# Patient Record
Sex: FEMALE | Race: WHITE | NOT HISPANIC OR LATINO | Employment: FULL TIME | URBAN - METROPOLITAN AREA
[De-identification: names, ages, dates, MRNs, and addresses within clinical notes are randomized per-mention and may not be internally consistent; named-entity substitution may affect disease eponyms.]

---

## 2021-07-06 ENCOUNTER — OFFICE VISIT (OUTPATIENT)
Dept: URGENT CARE | Facility: CLINIC | Age: 36
End: 2021-07-06
Payer: COMMERCIAL

## 2021-07-06 VITALS
SYSTOLIC BLOOD PRESSURE: 100 MMHG | TEMPERATURE: 99.7 F | OXYGEN SATURATION: 96 % | HEIGHT: 66 IN | RESPIRATION RATE: 12 BRPM | WEIGHT: 150 LBS | HEART RATE: 106 BPM | BODY MASS INDEX: 24.11 KG/M2 | DIASTOLIC BLOOD PRESSURE: 64 MMHG

## 2021-07-06 DIAGNOSIS — J02.0 ACUTE STREPTOCOCCAL PHARYNGITIS: ICD-10-CM

## 2021-07-06 LAB
INT CON NEG: NORMAL
INT CON POS: NORMAL
S PYO AG THROAT QL: NORMAL

## 2021-07-06 PROCEDURE — 87880 STREP A ASSAY W/OPTIC: CPT | Performed by: NURSE PRACTITIONER

## 2021-07-06 PROCEDURE — 99203 OFFICE O/P NEW LOW 30 MIN: CPT | Performed by: NURSE PRACTITIONER

## 2021-07-06 RX ORDER — AMOXICILLIN 500 MG/1
500 CAPSULE ORAL 2 TIMES DAILY
Qty: 20 CAPSULE | Refills: 0 | Status: SHIPPED | OUTPATIENT
Start: 2021-07-06 | End: 2021-07-16

## 2021-07-06 NOTE — PROGRESS NOTES
Chief Complaint   Patient presents with   • Sore Throat         HISTORY OF PRESENT ILLNESS: Patient is a pleasant 36 y.o. female who presents today due to complaints of a sore throat for the past 8 days. Reports associated fever, chills, pain with swallowing.  T-max 102.  Pain is moderate. Denies associated rash, chest pain, urinary complaints, congestion, cough, or difficulty breathing. She has tried OTC medications at home without much improvement. Her daughter is ill with similar symptoms, has been seen in clinic today, positive for strep.  She has not been vaccinated for COVID-19.    Patient Active Problem List    Diagnosis Date Noted   • Other plastic surgery for unacceptable cosmetic appearance 10/01/2014       Allergies:Patient has no known allergies.    Current Outpatient Medications Ordered in Epic   Medication Sig Dispense Refill   • amoxicillin (AMOXIL) 500 MG Cap Take 1 capsule by mouth 2 times a day for 10 days. 20 capsule 0   • therapeutic multivitamin-minerals (THERAGRAN-M) TABS Take 1 Tab by mouth every day.     • glucosamine Sulfate 500 MG CAPS Take 500 mg by mouth every day.     • Homeopathic Products (ARNICA [place] PO) Take 1 Tab by mouth every day.       No current [facility]-ordered facility-administered medications on file.       Past Medical History:   Diagnosis Date   • Anesthesia     PONV       Social History     Tobacco Use   • Smoking status: Never Smoker   Substance Use Topics   • Alcohol use: Yes     Comment: 2/week   • Drug use: No       No family status information on file.   History reviewed. No pertinent family history.    ROS:  Review of Systems   Constitutional: Positive for fever, chills. Negative for weight loss, malaise, and fatigue.   HENT: Positive for sore throat. Negative for ear pain, nosebleeds, congestion.   Eyes: Negative for vision changes.   Cardiovascular: Negative for chest pain, palpitations, orthopnea and leg swelling.   Respiratory: Negative for cough, sputum  "production, shortness of breath and wheezing.   Gastrointestinal: Negative for abdominal pain, nausea, vomiting or diarrhea.   : Negative for changes in urination.   Skin: Negative for rash, diaphoresis.     Exam:  /64   Pulse (!) 106   Temp 37.6 °C (99.7 °F) (Temporal)   Resp 12   Ht 1.676 m (5' 6\")   Wt 68 kg (150 lb)   SpO2 96%   General: well-nourished, well-developed female in NAD  Head: normocephalic, atraumatic  Eyes: PERRLA, no conjunctival injection, acuity grossly intact, lids normal.  Ears: normal shape and symmetry, no tenderness, no discharge. External canals are without any significant edema or erythema. Tympanic membranes are without any inflammation, no effusion. Gross auditory acuity is intact.  Nose: symmetrical without tenderness, no discharge.  Mouth/Throat: reasonable hygiene. There is erythema, with exudates and tonsillar enlargement present.  Neck: no masses, range of motion within normal limits, no tracheal deviation. No obvious thyroid enlargement.   Lymph: bilateral anterior cervical adenopathy. No supraclavicular adenopathy.   Neuro: alert and oriented. Cranial nerves 1-12 grossly intact. No sensory deficit.   Cardiovascular: regular rate and rhythm. No edema.  Pulmonary: no distress. Chest is symmetrical with respiration, no wheezes, crackles, or rhonchi.   Musculoskeletal: no clubbing, appropriate muscle tone, gait is stable.  Skin: warm, dry, intact, no clubbing, no cyanosis, no rashes.   Psych: appropriate mood, affect, judgement.       POC strep positive      Assessment/Plan:  1. Acute streptococcal pharyngitis  POCT Rapid Strep A    amoxicillin (AMOXIL) 500 MG Cap           Patient is well-appearing, nontoxic, will treat as outpatient, antibiotic as directed. OTC motrin or tylenol for pain/fever control. Hand hygiene. Increase fluid intake, rest. Warm salt water gargles.   Supportive care, differential diagnoses, and indications for immediate follow-up discussed with " patient.   Pathogenesis of diagnosis discussed including typical length and natural progression.   Instructed to return to clinic or nearest emergency department for any change in condition, further concerns, or worsening of symptoms.  Patient states understanding of the plan of care and discharge instructions.  Instructed to make an appointment, for follow up, with her primary care provider.        Please note that this dictation was created using voice recognition software. I have made every reasonable attempt to correct obvious errors, but I expect that there are errors of grammar and possibly content that I did not discover before finalizing the note. N95 and safety glasses used for entire visit.       REA Parr.

## 2022-01-03 ENCOUNTER — HOSPITAL ENCOUNTER (OUTPATIENT)
Dept: RADIOLOGY | Facility: MEDICAL CENTER | Age: 37
End: 2022-01-03
Attending: OBSTETRICS & GYNECOLOGY
Payer: COMMERCIAL

## 2022-01-03 DIAGNOSIS — R10.2 ADNEXAL TENDERNESS, RIGHT: ICD-10-CM

## 2022-01-03 PROCEDURE — 76830 TRANSVAGINAL US NON-OB: CPT

## 2022-12-13 ENCOUNTER — OFFICE VISIT (OUTPATIENT)
Dept: URGENT CARE | Facility: CLINIC | Age: 37
End: 2022-12-13
Payer: COMMERCIAL

## 2022-12-13 ENCOUNTER — APPOINTMENT (OUTPATIENT)
Dept: URGENT CARE | Facility: PHYSICIAN GROUP | Age: 37
End: 2022-12-13

## 2022-12-13 VITALS
BODY MASS INDEX: 22.5 KG/M2 | RESPIRATION RATE: 14 BRPM | OXYGEN SATURATION: 97 % | HEIGHT: 66 IN | WEIGHT: 140 LBS | HEART RATE: 99 BPM | SYSTOLIC BLOOD PRESSURE: 98 MMHG | TEMPERATURE: 98.2 F | DIASTOLIC BLOOD PRESSURE: 66 MMHG

## 2022-12-13 DIAGNOSIS — R50.9 FEVER, UNSPECIFIED FEVER CAUSE: ICD-10-CM

## 2022-12-13 DIAGNOSIS — R05.1 ACUTE COUGH: ICD-10-CM

## 2022-12-13 DIAGNOSIS — J20.9 ACUTE BRONCHITIS, UNSPECIFIED ORGANISM: ICD-10-CM

## 2022-12-13 LAB
EXTERNAL QUALITY CONTROL: NORMAL
FLUAV+FLUBV AG SPEC QL IA: NEGATIVE
INT CON NEG: NORMAL
INT CON POS: NORMAL
S PYO AG THROAT QL: NEGATIVE
SARS-COV+SARS-COV-2 AG RESP QL IA.RAPID: NEGATIVE

## 2022-12-13 PROCEDURE — 87804 INFLUENZA ASSAY W/OPTIC: CPT

## 2022-12-13 PROCEDURE — 99213 OFFICE O/P EST LOW 20 MIN: CPT

## 2022-12-13 PROCEDURE — 87426 SARSCOV CORONAVIRUS AG IA: CPT

## 2022-12-13 PROCEDURE — 87880 STREP A ASSAY W/OPTIC: CPT

## 2022-12-13 RX ORDER — BENZONATATE 100 MG/1
100 CAPSULE ORAL 3 TIMES DAILY PRN
Qty: 60 CAPSULE | Refills: 0 | Status: SHIPPED | OUTPATIENT
Start: 2022-12-13

## 2022-12-13 RX ORDER — ALBUTEROL SULFATE 90 UG/1
1 AEROSOL, METERED RESPIRATORY (INHALATION) EVERY 6 HOURS PRN
Qty: 8.5 G | Refills: 0 | Status: SHIPPED | OUTPATIENT
Start: 2022-12-13

## 2022-12-13 RX ORDER — PREDNISONE 10 MG/1
20 TABLET ORAL DAILY
Qty: 10 TABLET | Refills: 0 | Status: SHIPPED | OUTPATIENT
Start: 2022-12-13 | End: 2022-12-18

## 2022-12-13 ASSESSMENT — ENCOUNTER SYMPTOMS
SORE THROAT: 1
WHEEZING: 1
FEVER: 1
COUGH: 1
HEADACHES: 1
VOMITING: 0
SHORTNESS OF BREATH: 1
MYALGIAS: 1
DIARRHEA: 0
SPUTUM PRODUCTION: 1

## 2022-12-14 NOTE — PROGRESS NOTES
Subjective     Yi Hussein is a 37 y.o. female who presents with URI (Sx 6 days fevers, fatigue , sob , body aches chills , )            HPI    Patient presents with symptoms that started 5 days ago.  Patient reports fever and chills, temperature highest at 101.7 °F, and last elevated temperature 3 days ago.  Patient patient currently endorses bilateral ear fullness, nasal congestion, sore throat, cough, and myalgias.  She describes her cough to be productive, worse at night, especially when lying supine.  She further notes accompanying shortness of breath and wheezing.  Patient been taking Tylenol, DayQuil, and NyQuil.  She reports these provide temporary relief.  She denies being COVID vaccinated.  She denies any known sick contacts, but reports that she has attended two parties recently.    Patient's current problem list, medications, and past medical/surgical history were reviewed in Epic.    PMH:  has a past medical history of Anesthesia.  MEDS:   Current Outpatient Medications:     therapeutic multivitamin-minerals (THERAGRAN-M) TABS, Take 1 Tab by mouth every day., Disp: , Rfl:     glucosamine Sulfate 500 MG CAPS, Take 500 mg by mouth every day., Disp: , Rfl:     Homeopathic Products (ARNICA [place] PO), Take 1 Tab by mouth every day., Disp: , Rfl:   ALLERGIES: No Known Allergies  SURGHX:   Past Surgical History:   Procedure Laterality Date    BREAST IMPLANT REMOVAL  10/1/2014    Performed by Abhijit Barbour M.D. at [facility] ORS    MAMMOPLASTY AUGMENTATION  10/1/2014    Performed by Abhijit Barbour M.D. at [facility] ORS    LIPOSUCTION  10/1/2014    Performed by Abhijit Barbour M.D. at [facility] ORS    [place] ENLARGE BREAST  2004     SOCHX:  reports that she has never smoked. She does not have any smokeless tobacco history on file. She reports current alcohol use. She reports that she does not use drugs.  FH: Reviewed with patient, not pertinent to this visit.  "      Review of Systems   Constitutional:  Positive for fever and malaise/fatigue.   HENT:  Positive for congestion, ear pain (ear fullness) and sore throat.    Respiratory:  Positive for cough, sputum production (green), shortness of breath and wheezing.    Gastrointestinal:  Negative for diarrhea and vomiting.   Musculoskeletal:  Positive for myalgias.   Neurological:  Positive for headaches.            Objective     BP (!) (P) 98/66 (BP Location: Left arm, Patient Position: Sitting, BP Cuff Size: Adult)   Pulse (P) 99   Temp (P) 36.8 °C (98.2 °F) (Temporal)   Resp (P) 14   Ht (P) 1.676 m (5' 6\")   Wt (P) 63.5 kg (140 lb)   LMP 12/12/2022   SpO2 (P) 97%   BMI (P) 22.60 kg/m²      Physical Exam  Constitutional:       Appearance: Normal appearance.   HENT:      Head: Normocephalic.      Nose: Congestion present.      Mouth/Throat:      Pharynx: Pharyngeal swelling and posterior oropharyngeal erythema present.   Eyes:      Extraocular Movements: Extraocular movements intact.   Cardiovascular:      Rate and Rhythm: Normal rate and regular rhythm.      Pulses: Normal pulses.      Heart sounds: Normal heart sounds.   Pulmonary:      Effort: Pulmonary effort is normal.      Breath sounds: Normal breath sounds.   Musculoskeletal:         General: Normal range of motion.      Cervical back: Normal range of motion.   Skin:     General: Skin is warm and dry.   Neurological:      General: No focal deficit present.      Mental Status: She is alert.   Psychiatric:         Mood and Affect: Mood normal.         Behavior: Behavior normal.         Judgment: Judgment normal.     Results:    Rapid strep a -negative   Rapid COVID test- negative   Influenza A/B-negative        Assessment & Plan     1. Acute bronchitis, unspecified organism    - predniSONE (DELTASONE) 10 MG Tab; Take 2 Tablets by mouth every day for 5 days.  Dispense: 10 Tablet; Refill: 0  - benzonatate (TESSALON) 100 MG Cap; Take 1 Capsule by mouth 3 times a " day as needed for Cough.  Dispense: 60 Capsule; Refill: 0  - albuterol 108 (90 Base) MCG/ACT Aero Soln inhalation aerosol; Inhale 1 Puff every 6 hours as needed for Shortness of Breath.  Dispense: 8.5 g; Refill: 0    2. Fever, unspecified fever cause    - POCT Rapid Strep A  - POCT Influenza A/B  - POCT SARS-COV Antigen TADEO (Symptomatic only)    3. Acute cough    - benzonatate (TESSALON) 100 MG Cap; Take 1 Capsule by mouth 3 times a day as needed for Cough.  Dispense: 60 Capsule; Refill: 0    Patient tested negative for strep, influenza, and COVID.  Her presentation is consistent with acute bronchitis.  She is prescribed prednisone daily for 5 days.  May take Tessalon Perles twice daily as needed for cough.  Albuterol inhaler every 6 hours as needed for shortness of breath, chest congestion, chest tightness, wheezing, or bouts of coughing.  Advised on symptomatic treatment at home.  Discussed treatment plan with patient, she is agreeable and verbalized understanding.  Educated patient on signs and symptoms watch out for, when to return to the clinic or go to the ER.    Recommended supportive treatment at home:  OTC Tylenol or Motrin for fever/discomfort.  OTC supportive care for nasal congestion - saline nasal spray/Flonase nasal spray and/or netipot  Humidifier and steam inhalation/warm showers.  Increase oral fluid intake.    Electronically Signed by CHRISTEN Mary

## 2022-12-23 ENCOUNTER — OFFICE VISIT (OUTPATIENT)
Dept: URGENT CARE | Facility: PHYSICIAN GROUP | Age: 37
End: 2022-12-23
Payer: COMMERCIAL

## 2022-12-23 VITALS
DIASTOLIC BLOOD PRESSURE: 68 MMHG | RESPIRATION RATE: 16 BRPM | SYSTOLIC BLOOD PRESSURE: 122 MMHG | HEART RATE: 68 BPM | OXYGEN SATURATION: 98 % | TEMPERATURE: 98.2 F | HEIGHT: 66 IN | WEIGHT: 140 LBS | BODY MASS INDEX: 22.5 KG/M2

## 2022-12-23 DIAGNOSIS — B02.9 HERPES ZOSTER WITHOUT COMPLICATION: ICD-10-CM

## 2022-12-23 PROCEDURE — 99213 OFFICE O/P EST LOW 20 MIN: CPT | Performed by: STUDENT IN AN ORGANIZED HEALTH CARE EDUCATION/TRAINING PROGRAM

## 2022-12-23 RX ORDER — VALACYCLOVIR HYDROCHLORIDE 1 G/1
1000 TABLET, FILM COATED ORAL 3 TIMES DAILY
Qty: 21 TABLET | Refills: 0 | Status: SHIPPED | OUTPATIENT
Start: 2022-12-23 | End: 2022-12-30

## 2022-12-24 NOTE — PROGRESS NOTES
Subjective:   Yi Hussein is a 37 y.o. female who presents for Other (X 2 days, pain on L side of face and jaw, headache, shingles spot on top of lip )      HPI:  Pleasant 37-year-old female presents urgent care for 2 days of a painful blistered rash just above her upper lip on the left side.  She reports having a similar rash multiple years ago.  She denies ever being treated with antivirals in the past.  She states that the rash is painful.  It did initially started as multiple fluid-filled blisters.  She does report some intermittent headache and some pain over the left side of her jaw.  Denies fever, chills, blurred vision, double vision, photophobia, eye pain, eye redness, facial numbness, facial paralysis, hearing loss, tinnitus, or ear pain.      Medications:    albuterol Aers  ARNICA [place] PO  benzonatate Caps  glucosamine Sulfate Caps  mupirocin Oint  therapeutic multivitamin-minerals Tabs  valacyclovir Tabs    Allergies: Patient has no known allergies.    Problem List: Yi Hussein does not have any pertinent problems on file.    Surgical History:  Past Surgical History:   Procedure Laterality Date    BREAST IMPLANT REMOVAL  10/1/2014    Performed by Abhijit Barbour M.D. at [facility] ORS    MAMMOPLASTY AUGMENTATION  10/1/2014    Performed by Abhijit Barbour M.D. at [facility] ORS    LIPOSUCTION  10/1/2014    Performed by Abhijit Barbour M.D. at [facility] ORS    [place] ENLARGE BREAST  2004       Past Social Hx: Yi Hussein  reports that she has never smoked. She does not have any smokeless tobacco history on file. She reports current alcohol use. She reports that she does not use drugs.     Past Family Hx:  Yi Hussein family history is not on file.     Problem list, medications, and allergies reviewed by myself today in Epic.     Objective:     /68   Pulse 68   Temp 36.8 °C (98.2 °F) (Temporal)   Resp 16   Ht 1.676 m  "(5' 6\")   Wt 63.5 kg (140 lb)   LMP 12/12/2022   SpO2 98%   BMI 22.60 kg/m²     Physical Exam  Vitals reviewed.   Constitutional:       General: She is not in acute distress.     Appearance: Normal appearance.   HENT:      Head: Normocephalic.      Right Ear: Tympanic membrane, ear canal and external ear normal.      Left Ear: Tympanic membrane, ear canal and external ear normal.      Ears:      Comments: No vesicles present on the left ear canal.     Nose: Nose normal.      Mouth/Throat:      Mouth: Mucous membranes are moist.        Comments: 3 grouped vesicles on erythematous base.  There are signs of ruptured vesicles also present.  TTP present.  Patient describes burning sensation to the left jaw.  No rash over this area of pain.  Eyes:      Conjunctiva/sclera: Conjunctivae normal.      Right eye: Right conjunctiva is not injected.      Left eye: Left conjunctiva is not injected.      Pupils: Pupils are equal, round, and reactive to light.   Cardiovascular:      Rate and Rhythm: Normal rate and regular rhythm.      Pulses: Normal pulses.      Heart sounds: Normal heart sounds. No murmur heard.  Pulmonary:      Effort: Pulmonary effort is normal. No respiratory distress.      Breath sounds: Normal breath sounds. No stridor. No wheezing, rhonchi or rales.   Musculoskeletal:      Cervical back: Normal range of motion.   Lymphadenopathy:      Cervical: No cervical adenopathy.   Skin:     General: Skin is warm and dry.      Capillary Refill: Capillary refill takes less than 2 seconds.      Findings: No erythema, lesion or rash.   Neurological:      General: No focal deficit present.      Mental Status: She is alert and oriented to person, place, and time.       Assessment/Plan:     Diagnosis and associated orders:     1. Herpes zoster without complication  valacyclovir (VALTREX) 1 GM Tab    mupirocin (BACTROBAN) 2 % Ointment         Comments/MDM:     Patient's presentation physical exam findings are consistent " with shingles.  We will treat with valacyclovir due to active lesions.  Symptoms started within the past 2 days.  Patient was educated on use this medication and the possible side effects including retraction.  She was also given mupirocin to treat for any possible secondary bacterial infection.  She is agreeable to the plan.  No signs of complication due to this rash.  Vitals all within normal limits.  ED/return precautions were given.  No change to her hearing or vision.         Differential diagnosis, natural history, supportive care, and indications for immediate follow-up discussed.    Advised the patient to follow-up with the primary care physician for recheck, reevaluation, and consideration of further management.    Please note that this dictation was created using voice recognition software. I have made a reasonable attempt to correct obvious errors, but I expect that there are errors of grammar and possibly content that I did not discover before finalizing the note.    Electronically signed by Rocky Rogers PA-C.

## 2022-12-25 ENCOUNTER — OFFICE VISIT (OUTPATIENT)
Dept: URGENT CARE | Facility: CLINIC | Age: 37
End: 2022-12-25
Payer: COMMERCIAL

## 2022-12-25 VITALS
TEMPERATURE: 99.4 F | SYSTOLIC BLOOD PRESSURE: 100 MMHG | WEIGHT: 140 LBS | HEART RATE: 90 BPM | BODY MASS INDEX: 22.5 KG/M2 | OXYGEN SATURATION: 97 % | HEIGHT: 66 IN | RESPIRATION RATE: 16 BRPM | DIASTOLIC BLOOD PRESSURE: 54 MMHG

## 2022-12-25 DIAGNOSIS — J02.0 ACUTE STREPTOCOCCAL PHARYNGITIS: ICD-10-CM

## 2022-12-25 LAB
INT CON NEG: NORMAL
INT CON POS: NORMAL
S PYO AG THROAT QL: POSITIVE

## 2022-12-25 PROCEDURE — 99214 OFFICE O/P EST MOD 30 MIN: CPT | Performed by: NURSE PRACTITIONER

## 2022-12-25 PROCEDURE — 87880 STREP A ASSAY W/OPTIC: CPT | Performed by: NURSE PRACTITIONER

## 2022-12-25 RX ORDER — LIDOCAINE HYDROCHLORIDE 20 MG/ML
5 SOLUTION OROPHARYNGEAL 4 TIMES DAILY PRN
Qty: 120 ML | Refills: 0 | Status: SHIPPED | OUTPATIENT
Start: 2022-12-25

## 2022-12-25 RX ORDER — PENICILLIN V POTASSIUM 500 MG/1
500 TABLET ORAL 3 TIMES DAILY
Qty: 30 TABLET | Refills: 0 | Status: SHIPPED | OUTPATIENT
Start: 2022-12-25 | End: 2023-01-04

## 2022-12-25 NOTE — PROGRESS NOTES
Chief Complaint   Patient presents with    Pharyngitis     Sx yesterday , ear pain , hurts to swallow, white spots        HISTORY OF PRESENT ILLNESS: Patient is a pleasant 37 y.o. female who presents today due to complaints of a sore throat for the past two days. Reports associated fever, chills, pain with swallowing. Pain is moderate. Denies associated rash, chest pain, urinary complaints, congestion, cough, or difficulty breathing. She has tried OTC medications at home without much improvement. Denies known ill contacts.  She was recently diagnosed with herpes zoster, is taking antiviral therapy and tolerating.      Patient Active Problem List    Diagnosis Date Noted    Other plastic surgery for unacceptable cosmetic appearance 10/01/2014       Allergies:Patient has no known allergies.    Current Outpatient Medications Ordered in Epic   Medication Sig Dispense Refill    penicillin v potassium (VEETID) 500 MG Tab Take 1 Tablet by mouth 3 times a day for 10 days. 30 Tablet 0    lidocaine (XYLOCAINE) 2 % Solution Take 5 mL by mouth 4 times a day as needed for Throat/Mouth Pain. Mix 5mL in 1/4 cup of water, swish medication and spit. 120 mL 0    valacyclovir (VALTREX) 1 GM Tab Take 1 Tablet by mouth 3 times a day for 7 days. 21 Tablet 0    mupirocin (BACTROBAN) 2 % Ointment Apply 1 Application topically 2 times a day for 7 days. 22 g 0    benzonatate (TESSALON) 100 MG Cap Take 1 Capsule by mouth 3 times a day as needed for Cough. 60 Capsule 0    albuterol 108 (90 Base) MCG/ACT Aero Soln inhalation aerosol Inhale 1 Puff every 6 hours as needed for Shortness of Breath. 8.5 g 0     No current Epic-ordered facility-administered medications on file.       Past Medical History:   Diagnosis Date    Anesthesia     PONV       Social History     Tobacco Use    Smoking status: Never   Vaping Use    Vaping Use: Never used   Substance Use Topics    Alcohol use: Yes     Comment: 2/week    Drug use: No       No family status  "information on file.   History reviewed. No pertinent family history.    ROS:  Review of Systems   Constitutional: Positive for fever, chills. Negative for weight loss, malaise, and fatigue.   HENT: Positive for sore throat. Negative for ear pain, nosebleeds, congestion.   Eyes: Negative for vision changes.   Cardiovascular: Negative for chest pain, palpitations, orthopnea and leg swelling.   Respiratory: Negative for cough, sputum production, shortness of breath and wheezing.   Gastrointestinal: Negative for abdominal pain, nausea, vomiting or diarrhea.   : Negative for changes in urination.   Skin: Positive for painful rash to face.  Negative for diaphoresis.     Exam:  /54 (BP Location: Left arm, Patient Position: Sitting, BP Cuff Size: Adult)   Pulse 90   Temp 37.4 °C (99.4 °F) (Temporal)   Resp 16   Ht 1.676 m (5' 6\")   Wt 63.5 kg (140 lb)   SpO2 97%   General: well-nourished, well-developed female in NAD  Head: normocephalic, atraumatic  Eyes: PERRLA, no conjunctival injection, acuity grossly intact, lids normal.  Ears: normal shape and symmetry, no tenderness, no discharge. External canals are without any significant edema or erythema. Tympanic membranes are without any inflammation, no effusion. Gross auditory acuity is intact.  Nose: symmetrical without tenderness, no discharge.  Mouth/Throat: reasonable hygiene. There is erythema, without exudates or tonsillar enlargement present.  Neck: no masses, range of motion within normal limits, no tracheal deviation. No obvious thyroid enlargement.   Lymph: bilateral anterior cervical adenopathy. No supraclavicular adenopathy.   Neuro: alert and oriented. Cranial nerves 1-12 grossly intact. No sensory deficit.   Cardiovascular: regular rate and rhythm. No edema.  Pulmonary: no distress. Chest is symmetrical with respiration, no wheezes, crackles, or rhonchi.   Musculoskeletal: no clubbing, appropriate muscle tone, gait is stable.  Skin: warm, dry, " intact, no clubbing, no cyanosis.  Round, vesicular appearing rash noted to left side of face above lip.  Psych: appropriate mood, affect, judgement.       POC strep positive      Assessment/Plan:  1. Acute streptococcal pharyngitis  POCT Rapid Strep A    penicillin v potassium (VEETID) 500 MG Tab    lidocaine (XYLOCAINE) 2 % Solution              Antibiotic as directed. OTC motrin or tylenol for pain/fever control. Hand hygiene. Increase fluid intake, rest. Warm salt water gargles.  Lidocaine as needed.  Supportive care, differential diagnoses, and indications for immediate follow-up discussed with patient.   Pathogenesis of diagnosis discussed including typical length and natural progression.   Instructed to return to clinic or nearest emergency department for any change in condition, further concerns, or worsening of symptoms.  Patient states understanding of the plan of care and discharge instructions.  Instructed to make an appointment, for follow up, with her primary care provider.        Please note that this dictation was created using voice recognition software. I have made every reasonable attempt to correct obvious errors, but I expect that there are errors of grammar and possibly content that I did not discover before finalizing the note. N95 and safety glasses used for entire visit.       REA Parr.

## 2023-01-18 ENCOUNTER — HOSPITAL ENCOUNTER (OUTPATIENT)
Dept: LAB | Facility: MEDICAL CENTER | Age: 38
End: 2023-01-18
Attending: FAMILY MEDICINE
Payer: COMMERCIAL

## 2023-01-18 LAB
ALBUMIN SERPL BCP-MCNC: 4.6 G/DL (ref 3.2–4.9)
ALBUMIN/GLOB SERPL: 1.6 G/DL
ALP SERPL-CCNC: 40 U/L (ref 30–99)
ALT SERPL-CCNC: 11 U/L (ref 2–50)
ANION GAP SERPL CALC-SCNC: 10 MMOL/L (ref 7–16)
AST SERPL-CCNC: 18 U/L (ref 12–45)
BASOPHILS # BLD AUTO: 1.3 % (ref 0–1.8)
BASOPHILS # BLD: 0.05 K/UL (ref 0–0.12)
BILIRUB SERPL-MCNC: 0.7 MG/DL (ref 0.1–1.5)
BUN SERPL-MCNC: 9 MG/DL (ref 8–22)
CALCIUM ALBUM COR SERPL-MCNC: 9 MG/DL (ref 8.5–10.5)
CALCIUM SERPL-MCNC: 9.5 MG/DL (ref 8.5–10.5)
CHLORIDE SERPL-SCNC: 103 MMOL/L (ref 96–112)
CHOLEST SERPL-MCNC: 185 MG/DL (ref 100–199)
CO2 SERPL-SCNC: 27 MMOL/L (ref 20–33)
CREAT SERPL-MCNC: 0.64 MG/DL (ref 0.5–1.4)
EOSINOPHIL # BLD AUTO: 0.06 K/UL (ref 0–0.51)
EOSINOPHIL NFR BLD: 1.6 % (ref 0–6.9)
ERYTHROCYTE [DISTWIDTH] IN BLOOD BY AUTOMATED COUNT: 45.1 FL (ref 35.9–50)
EST. AVERAGE GLUCOSE BLD GHB EST-MCNC: 103 MG/DL
FASTING STATUS PATIENT QL REPORTED: NORMAL
GFR SERPLBLD CREATININE-BSD FMLA CKD-EPI: 115 ML/MIN/1.73 M 2
GLOBULIN SER CALC-MCNC: 2.8 G/DL (ref 1.9–3.5)
GLUCOSE SERPL-MCNC: 94 MG/DL (ref 65–99)
HBA1C MFR BLD: 5.2 % (ref 4–5.6)
HCT VFR BLD AUTO: 41.4 % (ref 37–47)
HDLC SERPL-MCNC: 69 MG/DL
HGB BLD-MCNC: 13.8 G/DL (ref 12–16)
IMM GRANULOCYTES # BLD AUTO: 0.01 K/UL (ref 0–0.11)
IMM GRANULOCYTES NFR BLD AUTO: 0.3 % (ref 0–0.9)
LDLC SERPL CALC-MCNC: 103 MG/DL
LYMPHOCYTES # BLD AUTO: 1.56 K/UL (ref 1–4.8)
LYMPHOCYTES NFR BLD: 41.3 % (ref 22–41)
MCH RBC QN AUTO: 31.4 PG (ref 27–33)
MCHC RBC AUTO-ENTMCNC: 33.3 G/DL (ref 33.6–35)
MCV RBC AUTO: 94.1 FL (ref 81.4–97.8)
MONOCYTES # BLD AUTO: 0.3 K/UL (ref 0–0.85)
MONOCYTES NFR BLD AUTO: 7.9 % (ref 0–13.4)
NEUTROPHILS # BLD AUTO: 1.8 K/UL (ref 2–7.15)
NEUTROPHILS NFR BLD: 47.6 % (ref 44–72)
NRBC # BLD AUTO: 0 K/UL
NRBC BLD-RTO: 0 /100 WBC
PLATELET # BLD AUTO: 258 K/UL (ref 164–446)
PMV BLD AUTO: 9.8 FL (ref 9–12.9)
POTASSIUM SERPL-SCNC: 4.4 MMOL/L (ref 3.6–5.5)
PROT SERPL-MCNC: 7.4 G/DL (ref 6–8.2)
RBC # BLD AUTO: 4.4 M/UL (ref 4.2–5.4)
SODIUM SERPL-SCNC: 140 MMOL/L (ref 135–145)
TRIGL SERPL-MCNC: 63 MG/DL (ref 0–149)
WBC # BLD AUTO: 3.8 K/UL (ref 4.8–10.8)

## 2023-01-18 PROCEDURE — 84439 ASSAY OF FREE THYROXINE: CPT

## 2023-01-18 PROCEDURE — 84443 ASSAY THYROID STIM HORMONE: CPT

## 2023-01-18 PROCEDURE — 85025 COMPLETE CBC W/AUTO DIFF WBC: CPT

## 2023-01-18 PROCEDURE — 84481 FREE ASSAY (FT-3): CPT

## 2023-01-18 PROCEDURE — 36415 COLL VENOUS BLD VENIPUNCTURE: CPT

## 2023-01-18 PROCEDURE — 80053 COMPREHEN METABOLIC PANEL: CPT

## 2023-01-18 PROCEDURE — 83036 HEMOGLOBIN GLYCOSYLATED A1C: CPT

## 2023-01-18 PROCEDURE — 80061 LIPID PANEL: CPT

## 2023-01-18 PROCEDURE — 86800 THYROGLOBULIN ANTIBODY: CPT

## 2023-01-19 LAB
T3FREE SERPL-MCNC: 3.07 PG/ML (ref 2–4.4)
T4 FREE SERPL-MCNC: 1.15 NG/DL (ref 0.93–1.7)
THYROGLOB AB SERPL-ACNC: <0.9 IU/ML (ref 0–4)
TSH SERPL DL<=0.005 MIU/L-ACNC: 4.22 UIU/ML (ref 0.38–5.33)

## 2023-12-26 ENCOUNTER — OFFICE VISIT (OUTPATIENT)
Dept: URGENT CARE | Facility: CLINIC | Age: 38
End: 2023-12-26
Payer: COMMERCIAL

## 2023-12-26 VITALS
HEIGHT: 66 IN | BODY MASS INDEX: 23.3 KG/M2 | DIASTOLIC BLOOD PRESSURE: 60 MMHG | OXYGEN SATURATION: 98 % | TEMPERATURE: 97.9 F | HEART RATE: 62 BPM | SYSTOLIC BLOOD PRESSURE: 100 MMHG | RESPIRATION RATE: 16 BRPM | WEIGHT: 145 LBS

## 2023-12-26 DIAGNOSIS — B02.9 HERPES ZOSTER WITHOUT COMPLICATION: ICD-10-CM

## 2023-12-26 PROCEDURE — 3078F DIAST BP <80 MM HG: CPT

## 2023-12-26 PROCEDURE — 3074F SYST BP LT 130 MM HG: CPT

## 2023-12-26 PROCEDURE — 99213 OFFICE O/P EST LOW 20 MIN: CPT

## 2023-12-26 RX ORDER — VALACYCLOVIR HYDROCHLORIDE 1 G/1
1000 TABLET, FILM COATED ORAL 3 TIMES DAILY
Qty: 21 TABLET | Refills: 0 | Status: SHIPPED | OUTPATIENT
Start: 2023-12-26 | End: 2024-01-02

## 2023-12-26 ASSESSMENT — FIBROSIS 4 INDEX: FIB4 SCORE: 0.82

## 2023-12-26 NOTE — PROGRESS NOTES
"Chief Complaint   Patient presents with    Herpes Zoster     Need antiviral medication          Subjective:   HISTORY OF PRESENT ILLNESS: Yi Hussein is a 38 y.o. female who presents for a herpes zoster outbreak to the left side of her face under her nose.  She reports she has noticed some mild pain and stinging to her left jaw.  SHe had a similar outbreak this time last year   Patient denies fever, severe pain    Medications, Allergies, current problem list, Social and Family history reviewed today in Epic.     Objective:     /60 (BP Location: Left arm, Patient Position: Sitting, BP Cuff Size: Adult)   Pulse 62   Temp 36.6 °C (97.9 °F) (Temporal)   Resp 16   Ht 1.676 m (5' 6\")   Wt 65.8 kg (145 lb)   SpO2 98%     Physical Exam  Vitals reviewed.   Constitutional:       Appearance: Normal appearance.   HENT:      Head:        Comments: Vesicular like lesions under left nares, there is some mild crusting that is honey crusted.      Mouth/Throat:      Mouth: Mucous membranes are moist.   Cardiovascular:      Rate and Rhythm: Normal rate.   Pulmonary:      Effort: Pulmonary effort is normal.   Skin:     General: Skin is warm and dry.   Neurological:      Mental Status: She is alert and oriented to person, place, and time.   Psychiatric:         Mood and Affect: Mood normal.          Assessment/Plan:     Diagnosis and associated orders    I personally reviewed prior external notes and test results pertinent to today's visit.     1. Herpes zoster without complication  valacyclovir (VALTREX) 1 GM Tab    mupirocin (BACTROBAN) 2 % Ointment            IMPRESSION:  Exam findings reassuring with stable vital signs, No red flag symptoms or exam findings. There are vesicular lesions that could represent a herpes zoster, she may be developing a secondary impetigo so I did give her mupriocin ointment.     Differential diagnosis discussed. Pt was Educated on red flag symptoms. Pt has been Instructed to return " to Urgent Care or nearest Emergency Department if symptoms fail to improve, for any change in condition, further concerns, or new concerning symptoms. Patient states understanding of the plan of care and discharge instructions.  They are discharged in stable condition.         Please note that this dictation was created using voice recognition software. I have made a reasonable attempt to correct obvious errors, but I expect that there are errors of grammar and possibly content that I did not discover before finalizing the note.    This note was electronically signed by MALLORY Brito